# Patient Record
Sex: FEMALE | Race: WHITE | NOT HISPANIC OR LATINO | Employment: UNEMPLOYED | URBAN - METROPOLITAN AREA
[De-identification: names, ages, dates, MRNs, and addresses within clinical notes are randomized per-mention and may not be internally consistent; named-entity substitution may affect disease eponyms.]

---

## 2017-12-29 ENCOUNTER — OFFICE VISIT (OUTPATIENT)
Dept: CARDIOLOGY | Facility: MEDICAL CENTER | Age: 27
End: 2017-12-29
Payer: COMMERCIAL

## 2017-12-29 VITALS
DIASTOLIC BLOOD PRESSURE: 100 MMHG | OXYGEN SATURATION: 97 % | HEIGHT: 60 IN | SYSTOLIC BLOOD PRESSURE: 130 MMHG | WEIGHT: 147 LBS | BODY MASS INDEX: 28.86 KG/M2 | HEART RATE: 123 BPM

## 2017-12-29 DIAGNOSIS — R00.2 PALPITATIONS: ICD-10-CM

## 2017-12-29 PROCEDURE — 93000 ELECTROCARDIOGRAM COMPLETE: CPT | Performed by: INTERNAL MEDICINE

## 2017-12-29 PROCEDURE — 99204 OFFICE O/P NEW MOD 45 MIN: CPT | Performed by: INTERNAL MEDICINE

## 2017-12-29 ASSESSMENT — ENCOUNTER SYMPTOMS
WEIGHT LOSS: 0
BLOOD IN STOOL: 0
VOMITING: 0
DIZZINESS: 0
BLURRED VISION: 0
PND: 0
CHILLS: 0
ABDOMINAL PAIN: 0
SENSORY CHANGE: 0
LOSS OF CONSCIOUSNESS: 0
SHORTNESS OF BREATH: 0
ORTHOPNEA: 0
CLAUDICATION: 0
COUGH: 0
HALLUCINATIONS: 0
DOUBLE VISION: 0
DEPRESSION: 0
FEVER: 0
SPEECH CHANGE: 0
EYE DISCHARGE: 0
HEADACHES: 0
MYALGIAS: 0
PALPITATIONS: 1
NAUSEA: 0
BRUISES/BLEEDS EASILY: 0
EYE PAIN: 0
FALLS: 0

## 2017-12-29 NOTE — PROGRESS NOTES
Subjective:   Rhea Haynes is a 27 y.o. female who presents today cardiac care and evaluation of palpitations. Palpitation is sporadic. No specific worsening symptoms or precipitating symptoms. Patient feels like his heart is being pulled down. No family history of sudden cardiac death. No presyncope or syncope associated with his palpitations.    I have reviewed patient's ECG, which shows normal sinus rhythm, normal WI, QT intervals. No evidence of acute coronary syndrome.      History reviewed. No pertinent past medical history.  History reviewed. No pertinent surgical history.  Family History   Problem Relation Age of Onset   • Heart Disease Mother      History   Smoking Status   • Never Smoker   Smokeless Tobacco   • Never Used     No Known Allergies  No outpatient encounter prescriptions on file as of 12/29/2017.     No facility-administered encounter medications on file as of 12/29/2017.      Review of Systems   Constitutional: Negative for chills, fever, malaise/fatigue and weight loss.   HENT: Negative for ear discharge, ear pain, hearing loss and nosebleeds.    Eyes: Negative for blurred vision, double vision, pain and discharge.   Respiratory: Negative for cough and shortness of breath.    Cardiovascular: Positive for palpitations. Negative for chest pain, orthopnea, claudication, leg swelling and PND.   Gastrointestinal: Negative for abdominal pain, blood in stool, melena, nausea and vomiting.   Genitourinary: Negative for dysuria and hematuria.   Musculoskeletal: Negative for falls, joint pain and myalgias.   Skin: Negative for itching and rash.   Neurological: Negative for dizziness, sensory change, speech change, loss of consciousness and headaches.   Endo/Heme/Allergies: Negative for environmental allergies. Does not bruise/bleed easily.   Psychiatric/Behavioral: Negative for depression, hallucinations and suicidal ideas.        Objective:   /100   Pulse (!) 123   Ht 1.524 m (5')   Wt  66.7 kg (147 lb)   SpO2 97%   BMI 28.71 kg/m²     Physical Exam   Constitutional: She is oriented to person, place, and time. She appears well-developed and well-nourished.   HENT:   Head: Normocephalic and atraumatic.   Eyes: EOM are normal.   Neck: No JVD present.   Cardiovascular: Normal rate, regular rhythm, normal heart sounds and intact distal pulses.  Exam reveals no gallop and no friction rub.    No murmur heard.  Pulmonary/Chest: No respiratory distress. She has no wheezes. She has no rales. She exhibits no tenderness.   Abdominal: She exhibits no distension. There is no tenderness. There is no rebound and no guarding.   Musculoskeletal: She exhibits no edema or tenderness.   Lymphadenopathy:     She has no cervical adenopathy.   Neurological: She is alert and oriented to person, place, and time.   Skin: Skin is dry.   Psychiatric: She has a normal mood and affect.   Nursing note and vitals reviewed.      Assessment:     1. Palpitations  EKG    ECHOCARDIOGRAM COMP W/O CONT    HOLTER MONITOR STUDY       Medical Decision Making:  Today's Assessment / Status / Plan:   Blood pressure is well controlled.  I will also order 48 hours home Holter monitoring.   I will order transthoracic echocardiogram to assess for structural abnormalities.    I will see patient back in clinic with lab tests and studies results in 3 months.

## 2017-12-31 LAB — EKG IMPRESSION: NORMAL

## 2018-01-12 ENCOUNTER — HOSPITAL ENCOUNTER (OUTPATIENT)
Dept: CARDIOLOGY | Facility: MEDICAL CENTER | Age: 28
End: 2018-01-12
Attending: INTERNAL MEDICINE

## 2018-01-19 ENCOUNTER — NON-PROVIDER VISIT (OUTPATIENT)
Dept: URGENT CARE | Facility: PHYSICIAN GROUP | Age: 28
End: 2018-01-19

## 2018-01-19 DIAGNOSIS — Z02.1 PRE-EMPLOYMENT DRUG SCREENING: ICD-10-CM

## 2018-01-19 LAB
AMP AMPHETAMINE: NORMAL
COC COCAINE: NORMAL
INT CON NEG: NEGATIVE
INT CON POS: POSITIVE
MET METHAMPHETAMINES: NORMAL
OPI OPIATES: NORMAL
PCP PHENCYCLIDINE: NORMAL
POC DRUG COMMENT 753798-OCCUPATIONAL HEALTH: NORMAL
THC: NORMAL

## 2018-01-19 PROCEDURE — 80305 DRUG TEST PRSMV DIR OPT OBS: CPT | Performed by: FAMILY MEDICINE

## 2018-01-26 ENCOUNTER — HOSPITAL ENCOUNTER (OUTPATIENT)
Dept: CARDIOLOGY | Facility: MEDICAL CENTER | Age: 28
End: 2018-01-26
Attending: INTERNAL MEDICINE
Payer: COMMERCIAL

## 2018-01-26 DIAGNOSIS — R00.2 PALPITATIONS: ICD-10-CM

## 2018-01-26 LAB
LV EJECT FRACT  99904: 65
LV EJECT FRACT MOD 2C 99903: 66.13
LV EJECT FRACT MOD 4C 99902: 66.24
LV EJECT FRACT MOD BP 99901: 66.51

## 2018-01-26 PROCEDURE — 93306 TTE W/DOPPLER COMPLETE: CPT

## 2018-01-26 PROCEDURE — 93306 TTE W/DOPPLER COMPLETE: CPT | Mod: 26 | Performed by: INTERNAL MEDICINE

## 2018-01-27 NOTE — PROGRESS NOTES
Dear Dorota,    Can you please let Rhea Dallas know that result is ok and I will see patient as scheduled?    Thanks Devante Raya.

## 2018-01-31 ENCOUNTER — TELEPHONE (OUTPATIENT)
Dept: CARDIOLOGY | Facility: MEDICAL CENTER | Age: 28
End: 2018-01-31

## 2018-01-31 NOTE — TELEPHONE ENCOUNTER
Contacted patient, discussed Dr. Bingham's review of transthoracic echo.  Patient acknowledges understanding.  Denies any questions.  Confirmed patient's appointment on 4/6/18.

## 2018-01-31 NOTE — TELEPHONE ENCOUNTER
----- Message from Meli Bingham M.D. sent at 1/26/2018  4:49 PM PST -----  Dear Dorota,    Can you please let Rhea Haynes know that result is ok and I will see patient as scheduled?    Thanks Devante Raya.

## 2018-04-26 ENCOUNTER — CHARTING TRANS (OUTPATIENT)
Dept: OTHER | Age: 28
End: 2018-04-26

## 2018-04-29 ENCOUNTER — CHARTING TRANS (OUTPATIENT)
Dept: OTHER | Age: 28
End: 2018-04-29

## 2019-01-31 ENCOUNTER — TELEPHONE (OUTPATIENT)
Dept: SCHEDULING | Age: 29
End: 2019-01-31

## 2019-02-07 ENCOUNTER — TELEPHONE (OUTPATIENT)
Dept: SCHEDULING | Age: 29
End: 2019-02-07

## 2019-07-30 PROCEDURE — 86803 HEPATITIS C AB TEST: CPT | Performed by: PHYSICIAN ASSISTANT

## 2019-07-30 PROCEDURE — 88175 CYTOPATH C/V AUTO FLUID REDO: CPT | Performed by: OBSTETRICS & GYNECOLOGY

## 2021-02-19 PROBLEM — O34.219 VBAC (VAGINAL BIRTH AFTER CESAREAN): Status: ACTIVE | Noted: 2021-02-19

## 2021-02-19 PROBLEM — O34.219 PREVIOUS CESAREAN DELIVERY, ANTEPARTUM: Status: ACTIVE | Noted: 2021-02-19
